# Patient Record
Sex: MALE | Race: NATIVE HAWAIIAN OR OTHER PACIFIC ISLANDER | URBAN - METROPOLITAN AREA
[De-identification: names, ages, dates, MRNs, and addresses within clinical notes are randomized per-mention and may not be internally consistent; named-entity substitution may affect disease eponyms.]

---

## 2023-01-30 ENCOUNTER — OFFICE VISIT (OUTPATIENT)
Dept: URBAN - METROPOLITAN AREA CLINIC 30 | Facility: CLINIC | Age: 23
End: 2023-01-30
Payer: COMMERCIAL

## 2023-01-30 DIAGNOSIS — H00.013 STYE OF RIGHT EYELID: Primary | ICD-10-CM

## 2023-01-30 PROCEDURE — 99202 OFFICE O/P NEW SF 15 MIN: CPT

## 2023-01-30 ASSESSMENT — INTRAOCULAR PRESSURE
OD: 13
OS: 13

## 2023-01-30 NOTE — IMPRESSION/PLAN
Impression: Stye of right eyelid: H00.013. Plan: Warm compresses x 10 min 5-6x/day minimum with lid massage. If fails to resolve, consider referral to oculo-plastics for evaluation